# Patient Record
Sex: MALE | Race: WHITE | NOT HISPANIC OR LATINO | Employment: FULL TIME | URBAN - METROPOLITAN AREA
[De-identification: names, ages, dates, MRNs, and addresses within clinical notes are randomized per-mention and may not be internally consistent; named-entity substitution may affect disease eponyms.]

---

## 2019-03-07 ENCOUNTER — OFFICE VISIT (OUTPATIENT)
Dept: FAMILY MEDICINE CLINIC | Facility: CLINIC | Age: 29
End: 2019-03-07

## 2019-03-07 VITALS
OXYGEN SATURATION: 98 % | DIASTOLIC BLOOD PRESSURE: 78 MMHG | BODY MASS INDEX: 27.15 KG/M2 | WEIGHT: 173 LBS | SYSTOLIC BLOOD PRESSURE: 124 MMHG | HEIGHT: 67 IN | RESPIRATION RATE: 13 BRPM | HEART RATE: 85 BPM

## 2019-03-07 DIAGNOSIS — Z71.6 ENCOUNTER FOR SMOKING CESSATION COUNSELING: ICD-10-CM

## 2019-03-07 DIAGNOSIS — R51.9 NONINTRACTABLE EPISODIC HEADACHE, UNSPECIFIED HEADACHE TYPE: ICD-10-CM

## 2019-03-07 DIAGNOSIS — R10.9 ABDOMINAL PAIN, UNSPECIFIED ABDOMINAL LOCATION: Primary | ICD-10-CM

## 2019-03-07 DIAGNOSIS — F43.10 PTSD (POST-TRAUMATIC STRESS DISORDER): ICD-10-CM

## 2019-03-07 DIAGNOSIS — Z23 NEED FOR VACCINATION: ICD-10-CM

## 2019-03-07 DIAGNOSIS — H93.13 TINNITUS OF BOTH EARS: ICD-10-CM

## 2019-03-07 DIAGNOSIS — F17.210 CIGARETTE NICOTINE DEPENDENCE WITHOUT COMPLICATION: ICD-10-CM

## 2019-03-07 DIAGNOSIS — R42 DIZZINESS: ICD-10-CM

## 2019-03-07 DIAGNOSIS — R20.0 NUMBNESS: ICD-10-CM

## 2019-03-07 DIAGNOSIS — Z13.220 SCREENING FOR HYPERLIPIDEMIA: ICD-10-CM

## 2019-03-07 DIAGNOSIS — F41.9 ANXIETY: ICD-10-CM

## 2019-03-07 DIAGNOSIS — R19.8 ALTERNATING CONSTIPATION AND DIARRHEA: ICD-10-CM

## 2019-03-07 PROCEDURE — 99406 BEHAV CHNG SMOKING 3-10 MIN: CPT | Performed by: FAMILY MEDICINE

## 2019-03-07 PROCEDURE — 90732 PPSV23 VACC 2 YRS+ SUBQ/IM: CPT | Performed by: FAMILY MEDICINE

## 2019-03-07 PROCEDURE — 90472 IMMUNIZATION ADMIN EACH ADD: CPT | Performed by: FAMILY MEDICINE

## 2019-03-07 PROCEDURE — 99204 OFFICE O/P NEW MOD 45 MIN: CPT | Performed by: FAMILY MEDICINE

## 2019-03-07 PROCEDURE — 90715 TDAP VACCINE 7 YRS/> IM: CPT | Performed by: FAMILY MEDICINE

## 2019-03-07 PROCEDURE — 90471 IMMUNIZATION ADMIN: CPT | Performed by: FAMILY MEDICINE

## 2019-03-07 RX ORDER — BUSPIRONE HYDROCHLORIDE 30 MG/1
30 TABLET ORAL 2 TIMES DAILY
Refills: 3 | COMMUNITY
Start: 2019-02-28 | End: 2019-03-07 | Stop reason: SDUPTHER

## 2019-03-07 RX ORDER — BUPROPION HYDROCHLORIDE 150 MG/1
150 TABLET, EXTENDED RELEASE ORAL 2 TIMES DAILY
Refills: 3 | COMMUNITY
Start: 2019-02-28 | End: 2019-03-07 | Stop reason: SDUPTHER

## 2019-03-07 RX ORDER — MECLIZINE HYDROCHLORIDE 25 MG/1
25 TABLET ORAL 3 TIMES DAILY PRN
Qty: 90 TABLET | Refills: 3 | Status: SHIPPED | OUTPATIENT
Start: 2019-03-07

## 2019-03-07 RX ORDER — BUPROPION HYDROCHLORIDE 150 MG/1
150 TABLET, EXTENDED RELEASE ORAL 2 TIMES DAILY
Qty: 180 TABLET | Refills: 3 | Status: SHIPPED | OUTPATIENT
Start: 2019-03-07 | End: 2019-12-12 | Stop reason: SDUPTHER

## 2019-03-07 RX ORDER — BUSPIRONE HYDROCHLORIDE 30 MG/1
30 TABLET ORAL 2 TIMES DAILY
Qty: 180 TABLET | Refills: 3 | Status: SHIPPED | OUTPATIENT
Start: 2019-03-07 | End: 2019-12-12 | Stop reason: SDUPTHER

## 2019-03-07 NOTE — PATIENT INSTRUCTIONS
Try incorporating oatmeal into your diet in the mornings.      a probiotic (Culturelle or Align) at your local pharmacy.    Hold Prilosec for 1 week.  Resume after breath test.      Consider a nicotine inhaler.     [facility]uit [facility] [place]  1-800-QUIT-NOW  https://[facility]quitlogix.org/en-[place]/  Steps to Quit Smoking  Smoking tobacco can be harmful to your health and can affect almost every organ in your body. Smoking puts you, and those around you, at risk for developing many serious chronic diseases. Quitting smoking is difficult, but it is one of the best things that you can do for your health. It is never too late to quit.  What are the benefits of quitting smoking?  When you quit smoking, you lower your risk of developing serious diseases and conditions, such as:  · Lung cancer or lung disease, such as COPD.  · Heart disease.  · Stroke.  · Heart attack.  · Infertility.  · Osteoporosis and bone fractures.  Additionally, symptoms such as coughing, wheezing, and shortness of breath may get better when you quit. You may also find that you get sick less often because your body is stronger at fighting off colds and infections. If you are pregnant, quitting smoking can help to reduce your chances of having a baby of low birth weight.  How do I get ready to quit?  When you decide to quit smoking, create a plan to make sure that you are successful. Before you quit:  · Pick a date to quit. Set a date within the next two weeks to give you time to prepare.  · Write down the reasons why you are quitting. Keep this list in places where you will see it often, such as on your bathroom mirror or in your car or wallet.  · Identify the people, places, things, and activities that make you want to smoke (triggers) and avoid them. Make sure to take these actions:  ¨ Throw away all cigarettes at home, at work, and in your car.  ¨ Throw away smoking accessories, such as ashtrays and lighters.  ¨ Clean your car and make sure to empty  the ashtray.  ¨ Clean your home, including curtains and carpets.  · Tell your family, friends, and coworkers that you are quitting. Support from your loved ones can make quitting easier.  · Talk with your health care provider about your options for quitting smoking.  · Find out what treatment options are covered by your health insurance.  What strategies can I use to quit smoking?  Talk with your healthcare provider about different strategies to quit smoking. Some strategies include:  · Quitting smoking altogether instead of gradually lessening how much you smoke over a period of time. Research shows that quitting “cold turkey” is more successful than gradually quitting.  · Attending in-person counseling to help you build problem-solving skills. You are more likely to have success in quitting if you attend several counseling sessions. Even short sessions of 10 minutes can be effective.  · Finding resources and support systems that can help you to quit smoking and remain smoke-free after you quit. These resources are most helpful when you use them often. They can include:  ¨ Online chats with a counselor.  ¨ Telephone quitlines.  ¨ Printed self-help materials.  ¨ Support groups or group counseling.  ¨ Text messaging programs.  ¨ Mobile phone applications.  · Taking medicines to help you quit smoking. (If you are pregnant or breastfeeding, talk with your health care provider first.) Some medicines contain nicotine and some do not. Both types of medicines help with cravings, but the medicines that include nicotine help to relieve withdrawal symptoms. Your health care provider may recommend:  ¨ Nicotine patches, gum, or lozenges.  ¨ Nicotine inhalers or sprays.  ¨ Non-nicotine medicine that is taken by mouth.  Talk with your health care provider about combining strategies, such as taking medicines while you are also receiving in-person counseling. Using these two strategies together makes you more likely to succeed in  quitting than if you used either strategy on its own.  If you are pregnant or breastfeeding, talk with your health care provider about finding counseling or other support strategies to quit smoking. Do not take medicine to help you quit smoking unless told to do so by your health care provider.  What things can I do to make it easier to quit?  Quitting smoking might feel overwhelming at first, but there is a lot that you can do to make it easier. Take these important actions:  · Reach out to your family and friends and ask that they support and encourage you during this time. Call telephone quitlines, reach out to support groups, or work with a counselor for support.  · Ask people who smoke to avoid smoking around you.  · Avoid places that trigger you to smoke, such as bars, parties, or smoke-break areas at work.  · Spend time around people who do not smoke.  · Lessen stress in your life, because stress can be a smoking trigger for some people. To lessen stress, try:  ¨ Exercising regularly.  ¨ Deep-breathing exercises.  ¨ Yoga.  ¨ Meditating.  ¨ Performing a body scan. This involves closing your eyes, scanning your body from head to toe, and noticing which parts of your body are particularly tense. Purposefully relax the muscles in those areas.  · Download or purchase mobile phone or tablet apps (applications) that can help you stick to your quit plan by providing reminders, tips, and encouragement. There are many free apps, such as QuitGuide from the CDC (Centers for Disease Control and Prevention). You can find other support for quitting smoking (smoking cessation) through smokefree.gov and other websites.  How will I feel when I quit smoking?  Within the first 24 hours of quitting smoking, you may start to feel some withdrawal symptoms. These symptoms are usually most noticeable 2-3 days after quitting, but they usually do not last beyond 2-3 weeks. Changes or symptoms that you might experience include:  · Mood  "swings.  · Restlessness, anxiety, or irritation.  · Difficulty concentrating.  · Dizziness.  · Strong cravings for sugary foods in addition to nicotine.  · Mild weight gain.  · Constipation.  · Nausea.  · Coughing or a sore throat.  · Changes in how your medicines work in your body.  · A depressed mood.  · Difficulty sleeping (insomnia).  After the first 2-3 weeks of quitting, you may start to notice more positive results, such as:  · Improved sense of smell and taste.  · Decreased coughing and sore throat.  · Slower heart rate.  · Lower blood pressure.  · Clearer skin.  · The ability to breathe more easily.  · Fewer sick days.  Quitting smoking is very challenging for most people. Do not get discouraged if you are not successful the first time. Some people need to make many attempts to quit before they achieve long-term success. Do your best to stick to your quit plan, and talk with your health care provider if you have any questions or concerns.  This information is not intended to replace advice given to you by your health care provider. Make sure you discuss any questions you have with your health care provider.  Document Released: 12/12/2002 Document Revised: 08/15/2017 Document Reviewed: 05/03/2016  [employer] Interactive Patient Education © 2017 [employer]      Dietary Guidelines to Help Prevent Kidney Stones  Kidney stones are deposits of minerals and salts that form inside your kidneys. Your risk of developing kidney stones may be greater depending on your diet, your lifestyle, the medicines you take, and whether you have certain medical conditions. Most people can reduce their chances of developing kidney stones by following the instructions below. Depending on your overall health and the type of kidney stones you tend to develop, your dietitian may give you more specific instructions.  What are tips for following this plan?  Reading food labels  · Choose foods with \"no salt added\" or \"low-salt\" labels. " Limit your sodium intake to less than 1500 mg per day.  · Choose foods with calcium for each meal and snack. Try to eat about 300 mg of calcium at each meal. Foods that contain 200-500 mg of calcium per serving include:  ? 8 oz (237 ml) of milk, fortified nondairy milk, and fortified fruit juice.  ? 8 oz (237 ml) of kefir, yogurt, and soy yogurt.  ? 4 oz (118 ml) of tofu.  ? 1 oz of cheese.  ? 1 cup (300 g) of dried figs.  ? 1 cup (91 g) of cooked broccoli.  ? 1-3 oz can of sardines or mackerel.  · Most people need 1000 to 1500 mg of calcium each day. Talk to your dietitian about how much calcium is recommended for you.  Shopping  · Buy plenty of fresh fruits and vegetables. Most people do not need to avoid fruits and vegetables, even if they contain nutrients that may contribute to kidney stones.  · When shopping for convenience foods, choose:  ? Whole pieces of fruit.  ? Premade salads with dressing on the side.  ? Low-fat fruit and yogurt smoothies.  · Avoid buying frozen meals or prepared deli foods.  · Look for foods with live cultures, such as yogurt and kefir.  Cooking  · Do not add salt to food when cooking. Place a salt shaker on the table and allow each person to add his or her own salt to taste.  · Use vegetable protein, such as beans, textured vegetable protein (TVP), or tofu instead of meat in pasta, casseroles, and soups.  Meal planning  · Eat less salt, if told by your dietitian. To do this:  ? Avoid eating processed or premade food.  ? Avoid eating fast food.  · Eat less animal protein, including cheese, meat, poultry, or fish, if told by your dietitian. To do this:  ? Limit the number of times you have meat, poultry, fish, or cheese each week. Eat a diet free of meat at least 2 days a week.  ? Eat only one serving each day of meat, poultry, fish, or seafood.  ? When you prepare animal protein, cut pieces into small portion sizes. For most meat and fish, one serving is about the size of one deck of  cards.  · Eat at least 5 servings of fresh fruits and vegetables each day. To do this:  ? Keep fruits and vegetables on hand for snacks.  ? Eat 1 piece of fruit or a handful of berries with breakfast.  ? Have a salad and fruit at lunch.  ? Have two kinds of vegetables at dinner.  · Limit foods that are high in a substance called oxalate. These include:  ? Spinach.  ? Rhubarb.  ? Beets.  ? Potato chips and french fries.  ? Nuts.  · If you regularly take a diuretic medicine, make sure to eat at least 1-2 fruits or vegetables high in potassium each day. These include:  ? Avocado.  ? Banana.  ? Orange, prune, carrot, or tomato juice.  ? Baked potato.  ? Cabbage.  ? Beans and split peas.  General instructions  · Drink enough fluid to keep your urine clear or pale yellow. This is the most important thing you can do.  · Talk to your health care provider and dietitian about taking daily supplements. Depending on your health and the cause of your kidney stones, you may be advised:  ? Not to take supplements with vitamin C.  ? To take a calcium supplement.  ? To take a daily probiotic supplement.  ? To take other supplements such as magnesium, fish oil, or vitamin B6.  · Take all medicines and supplements as told by your health care provider.  · Limit alcohol intake to no more than 1 drink a day for nonpregnant women and 2 drinks a day for men. One drink equals 12 oz of beer, 5 oz of wine, or 1½ oz of hard liquor.  · Lose weight if told by your health care provider. Work with your dietitian to find strategies and an eating plan that works best for you.  What foods are not recommended?  Limit your intake of the following foods, or as told by your dietitian. Talk to your dietitian about specific foods you should avoid based on the type of kidney stones and your overall health.  Grains  Breads. Bagels. Rolls. Baked goods. Salted crackers. Cereal. Pasta.  Vegetables  Spinach. Rhubarb. Beets. Canned vegetables. Pickles.  Olives.  Meats and other protein foods  Nuts. Nut butters. Large portions of meat, poultry, or fish. Salted or cured meats. Deli meats. Hot dogs. Sausages.  Dairy  Cheese.  Beverages  Regular soft drinks. Regular vegetable juice.  Seasonings and other foods  Seasoning blends with salt. Salad dressings. Canned soups. Soy sauce. Ketchup. Barbecue sauce. Canned pasta sauce. Casseroles. Pizza. Lasagna. Frozen meals. Potato chips. French fries.  Summary  · You can reduce your risk of kidney stones by making changes to your diet.  · The most important thing you can do is drink enough fluid. You should drink enough fluid to keep your urine clear or pale yellow.  · Ask your health care provider or dietitian how much protein from animal sources you should eat each day, and also how much salt and calcium you should have each day.  This information is not intended to replace advice given to you by your health care provider. Make sure you discuss any questions you have with your health care provider.  Document Released: 04/13/2012 Document Revised: 11/28/2017 Document Reviewed: 11/28/2017  [employer] Interactive Patient Education © 2018 [employer]

## 2019-03-07 NOTE — PROGRESS NOTES
"Subjective   Chandu Valentine is a 28 y.o. male.     Chief Complaint   Patient presents with   • Establish Care   • GI Problem   • Depression   • Anxiety   • Insomnia   • Dizziness     Patient presents to the clinic to establish care. He has not seen a primary care doctor in about 20 years.     GI Problem:  Patient reports a hx of H.Pylori infection and gastric ulcer for which he completed 2 weeks of antibiotic treatment about 2 years ago. He experiences persistent abdominal cramping and bloating. He states that he alternates between diarrhea and constipation. No BRBPR. He has taken prilosec 20 mg with some relief.  No N/V or urinary sx.     Anxiety/Depression:  Well managed with bupropion  mg/BID and buspirone 30 mg/BID. He states that he began these medications about 2 months ago. He tolerates these medications well with no adverse side effects. He notes that he has not tolerated paxil in the past due to erectile dysfunction. Patient states that he was hospitalized for 3 days for suicidal ideations at age 13. No hospitalizations since then. No hx of self harm. No recent SI/HI. Patient is followed by a therapist every 3 weeks.     Insomnia:  Patient states he has experienced difficulty sleeping since childhood. He reports night terrors and night sweats at least 3 times per week. He states that his wife tells him that he screams in different languages while he is sleeping; patient does speak any language other than english. No sleep walking.     Dizziness:  Patient reports dizziness, light-headedness, and an unsteady gait for the past few weeks. He states that he experiences numbness from his forehead to his neck and then a \"whooshing\" sound in his ears with changing positions. He notes that he recently started the Keto diet with his wife. No chest pain, SOB, palpitations, numbness/tingling, vision changes, or weakness.       Health Maintenance:  Patient would like a TDAP vaccination today. Patient smokes 0.5 " pack of cigarettes per day. He has been smoking since age 6. He is trying to quit smoking with the help of his therapist. He notes a hx of childhood asthma. He had pneumonia a few years ago; he would like a pneumonia vaccine today.          Review of Systems   Constitutional: Negative for activity change, appetite change, fatigue and unexpected weight change.   HENT: Negative for congestion, sinus pain, sore throat and tinnitus.    Eyes: Negative for pain and visual disturbance.   Respiratory: Negative for chest tightness, shortness of breath and wheezing.    Cardiovascular: Negative for chest pain, palpitations and leg swelling.   Gastrointestinal: Positive for abdominal pain, constipation (intermittent), diarrhea, nausea and vomiting. Negative for blood in stool.   Genitourinary: Negative for dysuria and hematuria.   Musculoskeletal: Positive for gait problem. Negative for arthralgias and myalgias.   Skin: Negative for color change and rash.   Allergic/Immunologic: Positive for environmental allergies. Negative for food allergies.   Neurological: Positive for dizziness, light-headedness and numbness. Negative for tremors, weakness and headaches.   Psychiatric/Behavioral: Positive for dysphoric mood and sleep disturbance. Negative for self-injury and suicidal ideas. The patient is nervous/anxious and has insomnia.        Past Medical History:   Diagnosis Date   • Allergic rhinitis    • Ankle fracture    • Anxiety     followed by a therapist, Hao Mott   • Asthma     as a child   • Concussion     LOC x 2, each less than 5 min   • Depression    • Gastric ulcer    • H. pylori infection    • Hip fracture (CMS/HCC)    • Hyperlipidemia    • Kidney stone    • Pneumonia    • Rib fractures    • Scapula fracture        Past Surgical History:   Procedure Laterality Date   • HAND SURGERY     • KIDNEY STONE SURGERY         Family History   Problem Relation Age of Onset   • Depression Mother    • Kidney nephrosis Mother    •  Diabetes Father    • Eczema Father    • Alcohol abuse Sister    • Drug abuse Sister    • Liver disease Maternal Grandmother    • Kidney nephrosis Maternal Grandfather    • Diabetes Paternal Grandmother    • Cancer Paternal Grandmother    • Diabetes Paternal Grandfather    • Breast cancer Maternal Aunt        Social History     Tobacco Use   • Smoking status: Current Every Day Smoker     Packs/day: 0.50   • Smokeless tobacco: Never Used   Substance Use Topics   • Alcohol use: Not on file     Comment: Social   • Drug use: Yes     Types: Marijuana     Social History     Social History Narrative    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       No Known Allergies     Outpatient Medications Prior to Visit   Medication Sig Dispense Refill   • buPROPion SR (WELLBUTRIN SR) 150 MG 12 hr tablet Take 150 mg by mouth 2 (Two) Times a Day.  3   • busPIRone (BUSPAR) 30 MG tablet Take 30 mg by mouth 2 (Two) Times a Day.  3     No facility-administered medications prior to visit.        Objective     Vitals:    03/07/19 0903   BP: 124/78   Pulse: 85   Resp: 13   SpO2: 98%       Physical Exam   Constitutional: He is oriented to person, place, and time. He appears well-developed and well-nourished.   HENT:   Head: Normocephalic and atraumatic.   Right Ear: Tympanic membrane and ear canal normal.   Left Ear: Tympanic membrane and ear canal normal.   Mouth/Throat: Oropharynx is clear and moist.   Eyes: Conjunctivae and EOM are normal. Pupils are equal, round, and reactive to light.   Neck: Normal range of motion. Neck supple. No thyromegaly present.   Cardiovascular: Normal rate, regular rhythm, S1 normal and S2 normal. Exam reveals no gallop and no friction rub.   No murmur heard.  Pulses:       Radial pulses are 2+ on the right side, and 2+ on the left side.   Pulmonary/Chest: Effort normal and breath sounds normal. No respiratory distress. He has no wheezes. He has no rales.   Abdominal: Soft. Bowel sounds are normal.  He exhibits no distension. There is no hepatosplenomegaly. There is no tenderness. There is no rigidity, no rebound and no guarding.   Musculoskeletal: Normal range of motion.   Diminished strength in the right 5th finger   Lymphadenopathy:     He has no cervical adenopathy.   Neurological: He is alert and oriented to person, place, and time. He has normal strength. No cranial nerve deficit or sensory deficit. He displays a negative Romberg sign. Gait normal.   Reflex Scores:       Bicep reflexes are 2+ on the right side and 2+ on the left side.       Brachioradialis reflexes are 2+ on the right side and 1+ on the left side.       Patellar reflexes are 2+ on the right side and 2+ on the left side.  Lilia-Hallpike maneuver negative bilaterally.   Well healed scar tissue at the left medial wrist.    Skin: Skin is warm and dry.   Psychiatric: He has a normal mood and affect.   Vitals reviewed.      ASSESSMENT/PLAN             Visit Diagnoses     Abdominal pain, unspecified abdominal location    -  Primary with normal abdominal exam today but hx of H pylori    Relevant Orders    POC Urea Breath Test to assess for possible recurrent H pylori infection    Comprehensive metabolic panel  Pt advised to start OTC prilosec 20 mg/day after breath test complete  Consider abdominal US and/or GI referral at follow up if these initial tests are not diagnostic      Anxiety    & PTSD (post-traumatic stress disorder)     well controlled, continue current medication regimen  Continue to follow up w/therapist as scheduled    Relevant Medications    buPROPion SR (WELLBUTRIN SR) 150 MG 12 hr tablet    busPIRone (BUSPAR) 30 MG tablet                     Need for vaccination        Relevant Orders    Pneumococcal Polysaccharide Vaccine 23-Valent (PPSV23) Greater Than or Equal To 1yo Subcutaneous / IM    Tdap Vaccine Greater Than or Equal To 8yo IM  Plan for Hepatitis A vaccine next visit      Dizziness        Relevant Medications    Trial of  meclizine (ANTIVERT) 25 MG tablet    Other Relevant Orders    Comprehensive metabolic panel    Hemoglobin A1c    CBC No Differential    TSH    Vitamin B12 & Folate    CT Head Without Contrast    Screening for hyperlipidemia        Relevant Orders    Lipid panel    Alternating constipation and diarrhea        Relevant Orders    POC Urea Breath Test    Comprehensive metabolic panel    TSH    Numbness        Relevant Orders    Comprehensive metabolic panel    Hemoglobin A1c    TSH    Vitamin B12 & Folate    CT Head Without Contrast    Tinnitus of both ears        Relevant Orders    CT Head Without Contrast    Nonintractable episodic headache, unspecified headache type        Relevant Orders    CT Head Without Contrast   Nicotine dependence (cigarettes) & Encounter for smoking cessation  Pt counseled on smoking cessation x 3 min         Patient Instructions   Try incorporating oatmeal into your diet in the mornings.      a probiotic (Culturelle or Align) at your local pharmacy.    Hold Prilosec for 1 week.  Resume after breath test.      Consider a nicotine inhaler.     Quit Now [place]  1-800-QUIT-NOW  https://[place].quitlogix.org/en-[place]/  Steps to Quit Smoking  Smoking tobacco can be harmful to your health and can affect almost every organ in your body. Smoking puts you, and those around you, at risk for developing many serious chronic diseases. Quitting smoking is difficult, but it is one of the best things that you can do for your health. It is never too late to quit.  What are the benefits of quitting smoking?  When you quit smoking, you lower your risk of developing serious diseases and conditions, such as:  · Lung cancer or lung disease, such as COPD.  · Heart disease.  · Stroke.  · Heart attack.  · Infertility.  · Osteoporosis and bone fractures.  Additionally, symptoms such as coughing, wheezing, and shortness of breath may get better when you quit. You may also find that you get sick less often  because your body is stronger at fighting off colds and infections. If you are pregnant, quitting smoking can help to reduce your chances of having a baby of low birth weight.  How do I get ready to quit?  When you decide to quit smoking, create a plan to make sure that you are successful. Before you quit:  · Pick a date to quit. Set a date within the next two weeks to give you time to prepare.  · Write down the reasons why you are quitting. Keep this list in places where you will see it often, such as on your bathroom mirror or in your car or wallet.  · Identify the people, places, things, and activities that make you want to smoke (triggers) and avoid them. Make sure to take these actions:  ¨ Throw away all cigarettes at home, at work, and in your car.  ¨ Throw away smoking accessories, such as ashtrays and lighters.  ¨ Clean your car and make sure to empty the ashtray.  ¨ Clean your home, including curtains and carpets.  · Tell your family, friends, and coworkers that you are quitting. Support from your loved ones can make quitting easier.  · Talk with your health care provider about your options for quitting smoking.  · Find out what treatment options are covered by your health insurance.  What strategies can I use to quit smoking?  Talk with your healthcare provider about different strategies to quit smoking. Some strategies include:  · Quitting smoking altogether instead of gradually lessening how much you smoke over a period of time. Research shows that quitting “cold turkey” is more successful than gradually quitting.  · Attending in-person counseling to help you build problem-solving skills. You are more likely to have success in quitting if you attend several counseling sessions. Even short sessions of 10 minutes can be effective.  · Finding resources and support systems that can help you to quit smoking and remain smoke-free after you quit. These resources are most helpful when you use them often. They  can include:  ¨ Online chats with a counselor.  ¨ Telephone quitlines.  ¨ Printed self-help materials.  ¨ Support groups or group counseling.  ¨ Text messaging programs.  ¨ Mobile phone applications.  · Taking medicines to help you quit smoking. (If you are pregnant or breastfeeding, talk with your health care provider first.) Some medicines contain nicotine and some do not. Both types of medicines help with cravings, but the medicines that include nicotine help to relieve withdrawal symptoms. Your health care provider may recommend:  ¨ Nicotine patches, gum, or lozenges.  ¨ Nicotine inhalers or sprays.  ¨ Non-nicotine medicine that is taken by mouth.  Talk with your health care provider about combining strategies, such as taking medicines while you are also receiving in-person counseling. Using these two strategies together makes you more likely to succeed in quitting than if you used either strategy on its own.  If you are pregnant or breastfeeding, talk with your health care provider about finding counseling or other support strategies to quit smoking. Do not take medicine to help you quit smoking unless told to do so by your health care provider.  What things can I do to make it easier to quit?  Quitting smoking might feel overwhelming at first, but there is a lot that you can do to make it easier. Take these important actions:  · Reach out to your family and friends and ask that they support and encourage you during this time. Call telephone quitlines, reach out to support groups, or work with a counselor for support.  · Ask people who smoke to avoid smoking around you.  · Avoid places that trigger you to smoke, such as bars, parties, or smoke-break areas at work.  · Spend time around people who do not smoke.  · Lessen stress in your life, because stress can be a smoking trigger for some people. To lessen stress, try:  ¨ Exercising regularly.  ¨ Deep-breathing exercises.  ¨ Yoga.  ¨ Meditating.  ¨ Performing a  body scan. This involves closing your eyes, scanning your body from head to toe, and noticing which parts of your body are particularly tense. Purposefully relax the muscles in those areas.  · Download or purchase mobile phone or tablet apps (applications) that can help you stick to your quit plan by providing reminders, tips, and encouragement. There are many free apps, such as QuitGuide from the CDC (Centers for Disease Control and Prevention). You can find other support for quitting smoking (smoking cessation) through smokefree.gov and other websites.  How will I feel when I quit smoking?  Within the first 24 hours of quitting smoking, you may start to feel some withdrawal symptoms. These symptoms are usually most noticeable 2-3 days after quitting, but they usually do not last beyond 2-3 weeks. Changes or symptoms that you might experience include:  · Mood swings.  · Restlessness, anxiety, or irritation.  · Difficulty concentrating.  · Dizziness.  · Strong cravings for sugary foods in addition to nicotine.  · Mild weight gain.  · Constipation.  · Nausea.  · Coughing or a sore throat.  · Changes in how your medicines work in your body.  · A depressed mood.  · Difficulty sleeping (insomnia).  After the first 2-3 weeks of quitting, you may start to notice more positive results, such as:  · Improved sense of smell and taste.  · Decreased coughing and sore throat.  · Slower heart rate.  · Lower blood pressure.  · Clearer skin.  · The ability to breathe more easily.  · Fewer sick days.  Quitting smoking is very challenging for most people. Do not get discouraged if you are not successful the first time. Some people need to make many attempts to quit before they achieve long-term success. Do your best to stick to your quit plan, and talk with your health care provider if you have any questions or concerns.  This information is not intended to replace advice given to you by your health care provider. Make sure you  "discuss any questions you have with your health care provider.  Document Released: 12/12/2002 Document Revised: 08/15/2017 Document Reviewed: 05/03/2016  [employer] Interactive Patient Education © 2017 [employer]      Dietary Guidelines to Help Prevent Kidney Stones  Kidney stones are deposits of minerals and salts that form inside your kidneys. Your risk of developing kidney stones may be greater depending on your diet, your lifestyle, the medicines you take, and whether you have certain medical conditions. Most people can reduce their chances of developing kidney stones by following the instructions below. Depending on your overall health and the type of kidney stones you tend to develop, your dietitian may give you more specific instructions.  What are tips for following this plan?  Reading food labels  · Choose foods with \"no salt added\" or \"low-salt\" labels. Limit your sodium intake to less than 1500 mg per day.  · Choose foods with calcium for each meal and snack. Try to eat about 300 mg of calcium at each meal. Foods that contain 200-500 mg of calcium per serving include:  ? 8 oz (237 ml) of milk, fortified nondairy milk, and fortified fruit juice.  ? 8 oz (237 ml) of kefir, yogurt, and soy yogurt.  ? 4 oz (118 ml) of tofu.  ? 1 oz of cheese.  ? 1 cup (300 g) of dried figs.  ? 1 cup (91 g) of cooked broccoli.  ? 1-3 oz can of sardines or mackerel.  · Most people need 1000 to 1500 mg of calcium each day. Talk to your dietitian about how much calcium is recommended for you.  Shopping  · Buy plenty of fresh fruits and vegetables. Most people do not need to avoid fruits and vegetables, even if they contain nutrients that may contribute to kidney stones.  · When shopping for convenience foods, choose:  ? Whole pieces of fruit.  ? Premade salads with dressing on the side.  ? Low-fat fruit and yogurt smoothies.  · Avoid buying frozen meals or prepared deli foods.  · Look for foods with live cultures, such as yogurt " and kefir.  Cooking  · Do not add salt to food when cooking. Place a salt shaker on the table and allow each person to add his or her own salt to taste.  · Use vegetable protein, such as beans, textured vegetable protein (TVP), or tofu instead of meat in pasta, casseroles, and soups.  Meal planning  · Eat less salt, if told by your dietitian. To do this:  ? Avoid eating processed or premade food.  ? Avoid eating fast food.  · Eat less animal protein, including cheese, meat, poultry, or fish, if told by your dietitian. To do this:  ? Limit the number of times you have meat, poultry, fish, or cheese each week. Eat a diet free of meat at least 2 days a week.  ? Eat only one serving each day of meat, poultry, fish, or seafood.  ? When you prepare animal protein, cut pieces into small portion sizes. For most meat and fish, one serving is about the size of one deck of cards.  · Eat at least 5 servings of fresh fruits and vegetables each day. To do this:  ? Keep fruits and vegetables on hand for snacks.  ? Eat 1 piece of fruit or a handful of berries with breakfast.  ? Have a salad and fruit at lunch.  ? Have two kinds of vegetables at dinner.  · Limit foods that are high in a substance called oxalate. These include:  ? Spinach.  ? Rhubarb.  ? Beets.  ? Potato chips and french fries.  ? Nuts.  · If you regularly take a diuretic medicine, make sure to eat at least 1-2 fruits or vegetables high in potassium each day. These include:  ? Avocado.  ? Banana.  ? Orange, prune, carrot, or tomato juice.  ? Baked potato.  ? Cabbage.  ? Beans and split peas.  General instructions  · Drink enough fluid to keep your urine clear or pale yellow. This is the most important thing you can do.  · Talk to your health care provider and dietitian about taking daily supplements. Depending on your health and the cause of your kidney stones, you may be advised:  ? Not to take supplements with vitamin C.  ? To take a calcium supplement.  ? To take  a daily probiotic supplement.  ? To take other supplements such as magnesium, fish oil, or vitamin B6.  · Take all medicines and supplements as told by your health care provider.  · Limit alcohol intake to no more than 1 drink a day for nonpregnant women and 2 drinks a day for men. One drink equals 12 oz of beer, 5 oz of wine, or 1½ oz of hard liquor.  · Lose weight if told by your health care provider. Work with your dietitian to find strategies and an eating plan that works best for you.  What foods are not recommended?  Limit your intake of the following foods, or as told by your dietitian. Talk to your dietitian about specific foods you should avoid based on the type of kidney stones and your overall health.  Grains  Breads. Bagels. Rolls. Baked goods. Salted crackers. Cereal. Pasta.  Vegetables  Spinach. Rhubarb. Beets. Canned vegetables. Pickles. Olives.  Meats and other protein foods  Nuts. Nut butters. Large portions of meat, poultry, or fish. Salted or cured meats. Deli meats. Hot dogs. Sausages.  Dairy  Cheese.  Beverages  Regular soft drinks. Regular vegetable juice.  Seasonings and other foods  Seasoning blends with salt. Salad dressings. Canned soups. Soy sauce. Ketchup. Barbecue sauce. Canned pasta sauce. Casseroles. Pizza. Lasagna. Frozen meals. Potato chips. French fries.  Summary  · You can reduce your risk of kidney stones by making changes to your diet.  · The most important thing you can do is drink enough fluid. You should drink enough fluid to keep your urine clear or pale yellow.  · Ask your health care provider or dietitian how much protein from animal sources you should eat each day, and also how much salt and calcium you should have each day.  This information is not intended to replace advice given to you by your health care provider. Make sure you discuss any questions you have with your health care provider.  Document Released: 04/13/2012 Document Revised: 11/28/2017 Document Reviewed:  11/28/2017  [employer] Interactive Patient Education © 2018 [employer]      Return in about 1 month (around 4/7/2019) for Recheck dizziness and abdominal pain (at least 1 week after CT scan and labs).      I,Donna Carranza, am scribing for, and in the presence of,Caroline Meraz MD. 3/7/2019 10:07 AM    I, Caroline Meraz MD   personally, performed the services described in this documentation, as scribed by,Donna Carranza, in my presence, and it is both accurate and complete.    Caroline Meraz MD  03/25/19  9:56 PM

## 2019-03-14 ENCOUNTER — HOSPITAL ENCOUNTER (OUTPATIENT)
Dept: CT IMAGING | Facility: HOSPITAL | Age: 29
Discharge: HOME | End: 2019-03-14
Admitting: FAMILY MEDICINE

## 2019-03-14 ENCOUNTER — LAB (OUTPATIENT)
Dept: FAMILY MEDICINE CLINIC | Facility: CLINIC | Age: 29
End: 2019-03-14

## 2019-03-14 DIAGNOSIS — R42 DIZZINESS: ICD-10-CM

## 2019-03-14 DIAGNOSIS — R20.0 NUMBNESS: ICD-10-CM

## 2019-03-14 DIAGNOSIS — Z13.220 SCREENING FOR HYPERLIPIDEMIA: ICD-10-CM

## 2019-03-14 DIAGNOSIS — R51.9 NONINTRACTABLE EPISODIC HEADACHE, UNSPECIFIED HEADACHE TYPE: ICD-10-CM

## 2019-03-14 DIAGNOSIS — H93.13 TINNITUS OF BOTH EARS: ICD-10-CM

## 2019-03-14 DIAGNOSIS — R10.9 ABDOMINAL PAIN, UNSPECIFIED ABDOMINAL LOCATION: ICD-10-CM

## 2019-03-14 DIAGNOSIS — R19.8 ALTERNATING CONSTIPATION AND DIARRHEA: ICD-10-CM

## 2019-03-14 DIAGNOSIS — R10.9 ABDOMINAL PAIN, UNSPECIFIED ABDOMINAL LOCATION: Primary | ICD-10-CM

## 2019-03-14 LAB
ALBUMIN SERPL-MCNC: 4.6 G/DL (ref 3.5–5.2)
ALBUMIN/GLOB SERPL: 1.8 G/DL
ALP SERPL-CCNC: 68 U/L (ref 39–117)
ALT SERPL-CCNC: 39 U/L (ref 1–41)
AST SERPL-CCNC: 35 U/L (ref 1–40)
BILIRUB SERPL-MCNC: 0.2 MG/DL (ref 0.1–1.2)
BUN SERPL-MCNC: 19 MG/DL (ref 6–20)
BUN/CREAT SERPL: 21.3 (ref 7–25)
CALCIUM SERPL-MCNC: 9.9 MG/DL (ref 8.6–10.5)
CHLORIDE SERPL-SCNC: 106 MMOL/L (ref 98–107)
CHOLEST SERPL-MCNC: 229 MG/DL (ref 0–200)
CO2 SERPL-SCNC: 26.1 MMOL/L (ref 22–29)
CREAT SERPL-MCNC: 0.89 MG/DL (ref 0.76–1.27)
ERYTHROCYTE [DISTWIDTH] IN BLOOD BY AUTOMATED COUNT: 12.5 % (ref 12.3–15.4)
FOLATE SERPL-MCNC: 5.54 NG/ML (ref 4.78–24.2)
GLOBULIN SER CALC-MCNC: 2.5 GM/DL
GLUCOSE SERPL-MCNC: 100 MG/DL (ref 65–99)
HBA1C MFR BLD: 5 % (ref 4.8–5.6)
HCT VFR BLD AUTO: 47.9 % (ref 37.5–51)
HDLC SERPL-MCNC: 44 MG/DL (ref 40–60)
HGB BLD-MCNC: 15 G/DL (ref 13–17.7)
LDLC SERPL CALC-MCNC: 149 MG/DL (ref 0–100)
MCH RBC QN AUTO: 31.2 PG (ref 26.6–33)
MCHC RBC AUTO-ENTMCNC: 31.3 G/DL (ref 31.5–35.7)
MCV RBC AUTO: 99.6 FL (ref 79–97)
PLATELET # BLD AUTO: 230 10*3/MM3 (ref 140–450)
POTASSIUM SERPL-SCNC: 5 MMOL/L (ref 3.5–5.2)
PROT SERPL-MCNC: 7.1 G/DL (ref 6–8.5)
RBC # BLD AUTO: 4.81 10*6/MM3 (ref 4.14–5.8)
SODIUM SERPL-SCNC: 147 MMOL/L (ref 136–145)
TRIGL SERPL-MCNC: 178 MG/DL (ref 0–150)
TSH SERPL DL<=0.005 MIU/L-ACNC: 2.04 MIU/ML (ref 0.27–4.2)
VIT B12 SERPL-MCNC: 893 PG/ML (ref 211–946)
VLDLC SERPL CALC-MCNC: 35.6 MG/DL (ref 5–40)
WBC # BLD AUTO: 6.54 10*3/MM3 (ref 3.4–10.8)

## 2019-03-14 PROCEDURE — 70450 CT HEAD/BRAIN W/O DYE: CPT

## 2019-03-18 LAB — UREA BREATH TEST QL: NORMAL

## 2019-03-19 DIAGNOSIS — R10.9 ABDOMINAL PAIN, UNSPECIFIED ABDOMINAL LOCATION: Primary | ICD-10-CM

## 2019-03-27 ENCOUNTER — TELEPHONE (OUTPATIENT)
Dept: FAMILY MEDICINE CLINIC | Facility: CLINIC | Age: 29
End: 2019-03-27

## 2019-03-27 NOTE — TELEPHONE ENCOUNTER
----- Message from Danisha Florian MA sent at 3/21/2019  2:57 PM EDT -----  Please call and remind him that he was going to come back in this week for repeat labs

## 2019-04-01 DIAGNOSIS — E87.0 SERUM SODIUM ELEVATED: Primary | ICD-10-CM

## 2019-04-11 ENCOUNTER — RESULTS ENCOUNTER (OUTPATIENT)
Dept: FAMILY MEDICINE CLINIC | Facility: CLINIC | Age: 29
End: 2019-04-11

## 2019-04-11 DIAGNOSIS — E87.0 SERUM SODIUM ELEVATED: ICD-10-CM

## 2019-04-11 LAB
BUN SERPL-MCNC: 10 MG/DL (ref 6–20)
BUN/CREAT SERPL: 10.4 (ref 7–25)
CALCIUM SERPL-MCNC: 10 MG/DL (ref 8.6–10.5)
CHLORIDE SERPL-SCNC: 102 MMOL/L (ref 98–107)
CO2 SERPL-SCNC: 28.7 MMOL/L (ref 22–29)
CREAT SERPL-MCNC: 0.96 MG/DL (ref 0.76–1.27)
GLUCOSE SERPL-MCNC: 90 MG/DL (ref 65–99)
POTASSIUM SERPL-SCNC: 5.1 MMOL/L (ref 3.5–5.2)
SODIUM SERPL-SCNC: 141 MMOL/L (ref 136–145)

## 2019-12-12 ENCOUNTER — OFFICE VISIT (OUTPATIENT)
Dept: FAMILY MEDICINE CLINIC | Facility: CLINIC | Age: 29
End: 2019-12-12

## 2019-12-12 VITALS
SYSTOLIC BLOOD PRESSURE: 120 MMHG | WEIGHT: 191 LBS | DIASTOLIC BLOOD PRESSURE: 80 MMHG | BODY MASS INDEX: 30.7 KG/M2 | OXYGEN SATURATION: 98 % | HEIGHT: 66 IN | HEART RATE: 71 BPM

## 2019-12-12 DIAGNOSIS — Z00.00 PHYSICAL EXAM: Primary | ICD-10-CM

## 2019-12-12 DIAGNOSIS — F41.9 ANXIETY: ICD-10-CM

## 2019-12-12 DIAGNOSIS — F43.10 PTSD (POST-TRAUMATIC STRESS DISORDER): ICD-10-CM

## 2019-12-12 PROCEDURE — 99395 PREV VISIT EST AGE 18-39: CPT | Performed by: NURSE PRACTITIONER

## 2019-12-12 RX ORDER — BUPROPION HYDROCHLORIDE 150 MG/1
150 TABLET, EXTENDED RELEASE ORAL 2 TIMES DAILY
Qty: 180 TABLET | Refills: 3 | Status: SHIPPED | OUTPATIENT
Start: 2019-12-12

## 2019-12-12 RX ORDER — BUSPIRONE HYDROCHLORIDE 30 MG/1
30 TABLET ORAL 2 TIMES DAILY
Qty: 180 TABLET | Refills: 3 | Status: SHIPPED | OUTPATIENT
Start: 2019-12-12

## 2019-12-12 NOTE — PATIENT INSTRUCTIONS
Preventive Care 18-39 Years, Male  Preventive care refers to lifestyle choices and visits with your health care provider that can promote health and wellness.  What does preventive care include?    · A yearly physical exam. This is also called an annual well check.  · Dental exams once or twice a year.  · Routine eye exams. Ask your health care provider how often you should have your eyes checked.  · Personal lifestyle choices, including:  ? Daily care of your teeth and gums.  ? Regular physical activity.  ? Eating a healthy diet.  ? Avoiding tobacco and drug use.  ? Limiting alcohol use.  ? Practicing safe sex.  What happens during an annual well check?  The services and screenings done by your health care provider during your annual well check will depend on your age, overall health, lifestyle risk factors, and family history of disease.  Counseling  Your health care provider may ask you questions about your:  · Alcohol use.  · Tobacco use.  · Drug use.  · Emotional well-being.  · Home and relationship well-being.  · Sexual activity.  · Eating habits.  · Work and work environment.  Screening  You may have the following tests or measurements:  · Height, weight, and BMI.  · Blood pressure.  · Lipid and cholesterol levels. These may be checked every 5 years starting at age 20.  · Diabetes screening. This is done by checking your blood sugar (glucose) after you have not eaten for a while (fasting).  · Skin check.  · Hepatitis C blood test.  · Hepatitis B blood test.  · Sexually transmitted disease (STD) testing.  Discuss your test results, treatment options, and if necessary, the need for more tests with your health care provider.  Vaccines  Your health care provider may recommend certain vaccines, such as:  · Influenza vaccine. This is recommended every year.  · Tetanus, diphtheria, and acellular pertussis (Tdap, Td) vaccine. You may need a Td booster every 10 years.  · Varicella vaccine. You may need this if you  have not been vaccinated.  · HPV vaccine. If you are 26 or younger, you may need three doses over 6 months.  · Measles, mumps, and rubella (MMR) vaccine. You may need at least one dose of MMR. You may also need a second dose.  · Pneumococcal 13-valent conjugate (PCV13) vaccine. You may need this if you have certain conditions and have not been vaccinated.  · Pneumococcal polysaccharide (PPSV23) vaccine. You may need one or two doses if you smoke cigarettes or if you have certain conditions.  · Meningococcal vaccine. One dose is recommended if you are age 19-21 years and a first-year college student living in a residence [place], or if you have one of several medical conditions. You may also need additional booster doses.  · Hepatitis A vaccine. You may need this if you have certain conditions or if you travel or work in places where you may be exposed to hepatitis A.  · Hepatitis B vaccine. You may need this if you have certain conditions or if you travel or work in places where you may be exposed to hepatitis B.  · Haemophilus influenzae type b (Hib) vaccine. You may need this if you have certain risk factors.  Talk to your health care provider about which screenings and vaccines you need and how often you need them.  This information is not intended to replace advice given to you by your health care provider. Make sure you discuss any questions you have with your health care provider.  Document Released: 02/13/2003 Document Revised: 07/31/2018 Document Reviewed: 10/18/2016  [email or web address] Interactive Patient Education © 2019 [email or web address]

## 2019-12-12 NOTE — PROGRESS NOTES
Subjective physical exam  Chandu Valentine is a 29 y.o. male.   Dr Meraz pt  History of Present Illness   PMHX anxiety and depression,and is stable on his medicine, hyperlipidemia.    PSHX and PFHX reviewed in chart and with pt.  No regular exercise  Mediocre sleep hygiene  Regular dental visits  Is a smoke  Mediocre diet    The following portions of the patient's history were reviewed and updated as appropriate: allergies, current medications, past family history, past medical history, past social history, past surgical history and problem list.    Review of Systems   Constitutional: Negative for activity change, appetite change and fatigue.   Respiratory: Negative for cough and shortness of breath.    Psychiatric/Behavioral: Positive for depressed mood. Negative for agitation.       Objective   Physical Exam   Constitutional: He is oriented to person, place, and time. He appears well-developed and well-nourished.   HENT:   Head: Normocephalic and atraumatic.   Right Ear: External ear normal.   Left Ear: External ear normal.   Mouth/Throat: Oropharynx is clear and moist.   Eyes: Pupils are equal, round, and reactive to light. Conjunctivae and EOM are normal.   Neck: Neck supple.   Cardiovascular: Normal rate and regular rhythm.   Pulmonary/Chest: Effort normal and breath sounds normal. No respiratory distress.   Abdominal: Bowel sounds are normal.   Musculoskeletal: Normal range of motion.   Lymphadenopathy:     He has no cervical adenopathy.   Neurological: He is alert and oriented to person, place, and time.   Skin: Skin is warm and dry.   Psychiatric: He has a normal mood and affect.   Nursing note and vitals reviewed.        Assessment/Plan   Chandu was seen today for depression.    Diagnoses and all orders for this visit:    Physical exam  -     Comprehensive Metabolic Panel  -     Lipid Panel With LDL / HDL Ratio    Anxiety  -     buPROPion SR (WELLBUTRIN SR) 150 MG 12 hr tablet; Take 1 tablet by mouth 2  (Two) Times a Day.  -     busPIRone (BUSPAR) 30 MG tablet; Take 1 tablet by mouth 2 (Two) Times a Day.    PTSD (post-traumatic stress disorder)  -     buPROPion SR (WELLBUTRIN SR) 150 MG 12 hr tablet; Take 1 tablet by mouth 2 (Two) Times a Day.  -     busPIRone (BUSPAR) 30 MG tablet; Take 1 tablet by mouth 2 (Two) Times a Day.        Preventative counseling for diet and exercise with patient at visit.  Pt reports that they wear their seatbelt regularly.

## 2019-12-13 LAB
ALBUMIN SERPL-MCNC: 4.8 G/DL (ref 3.5–5.2)
ALBUMIN/GLOB SERPL: 2.1 G/DL
ALP SERPL-CCNC: 71 U/L (ref 39–117)
ALT SERPL-CCNC: 13 U/L (ref 1–41)
AST SERPL-CCNC: 19 U/L (ref 1–40)
BILIRUB SERPL-MCNC: 0.5 MG/DL (ref 0.2–1.2)
BUN SERPL-MCNC: 10 MG/DL (ref 6–20)
BUN/CREAT SERPL: 11.4 (ref 7–25)
CALCIUM SERPL-MCNC: 9.4 MG/DL (ref 8.6–10.5)
CHLORIDE SERPL-SCNC: 100 MMOL/L (ref 98–107)
CHOLEST SERPL-MCNC: 227 MG/DL (ref 0–200)
CO2 SERPL-SCNC: 27.1 MMOL/L (ref 22–29)
CREAT SERPL-MCNC: 0.88 MG/DL (ref 0.76–1.27)
GLOBULIN SER CALC-MCNC: 2.3 GM/DL
GLUCOSE SERPL-MCNC: 78 MG/DL (ref 65–99)
HDLC SERPL-MCNC: 43 MG/DL (ref 40–60)
LDLC SERPL CALC-MCNC: 172 MG/DL (ref 0–100)
LDLC/HDLC SERPL: 4.01 {RATIO}
POTASSIUM SERPL-SCNC: 4.7 MMOL/L (ref 3.5–5.2)
PROT SERPL-MCNC: 7.1 G/DL (ref 6–8.5)
SODIUM SERPL-SCNC: 139 MMOL/L (ref 136–145)
TRIGL SERPL-MCNC: 58 MG/DL (ref 0–150)
VLDLC SERPL CALC-MCNC: 11.6 MG/DL